# Patient Record
Sex: FEMALE | Race: BLACK OR AFRICAN AMERICAN | NOT HISPANIC OR LATINO | Employment: UNEMPLOYED | URBAN - METROPOLITAN AREA
[De-identification: names, ages, dates, MRNs, and addresses within clinical notes are randomized per-mention and may not be internally consistent; named-entity substitution may affect disease eponyms.]

---

## 2023-12-12 ENCOUNTER — OFFICE VISIT (OUTPATIENT)
Dept: PRIMARY CARE | Facility: CLINIC | Age: 48
End: 2023-12-12
Payer: COMMERCIAL

## 2023-12-12 VITALS
BODY MASS INDEX: 53.71 KG/M2 | TEMPERATURE: 97.9 F | HEART RATE: 95 BPM | WEIGHT: 273.6 LBS | HEIGHT: 60 IN | OXYGEN SATURATION: 95 % | SYSTOLIC BLOOD PRESSURE: 148 MMHG | DIASTOLIC BLOOD PRESSURE: 70 MMHG

## 2023-12-12 DIAGNOSIS — Z12.31 BREAST CANCER SCREENING BY MAMMOGRAM: ICD-10-CM

## 2023-12-12 DIAGNOSIS — J44.9 CHRONIC OBSTRUCTIVE PULMONARY DISEASE, UNSPECIFIED COPD TYPE (MULTI): ICD-10-CM

## 2023-12-12 DIAGNOSIS — E66.01 OBESITY, MORBID, BMI 50 OR HIGHER (MULTI): Primary | ICD-10-CM

## 2023-12-12 DIAGNOSIS — I10 PRIMARY HYPERTENSION: ICD-10-CM

## 2023-12-12 DIAGNOSIS — Z12.11 COLON CANCER SCREENING: ICD-10-CM

## 2023-12-12 PROBLEM — M25.561 KNEE PAIN, RIGHT: Status: ACTIVE | Noted: 2023-12-12

## 2023-12-12 PROBLEM — E55.9 VITAMIN D DEFICIENCY: Status: ACTIVE | Noted: 2023-12-12

## 2023-12-12 LAB
25(OH)D3 SERPL-MCNC: 7 NG/ML (ref 30–100)
ALBUMIN SERPL BCP-MCNC: 4.1 G/DL (ref 3.4–5)
ALP SERPL-CCNC: 63 U/L (ref 33–110)
ALT SERPL W P-5'-P-CCNC: 20 U/L (ref 7–45)
ANION GAP SERPL CALC-SCNC: 14 MMOL/L (ref 10–20)
AST SERPL W P-5'-P-CCNC: 22 U/L (ref 9–39)
BILIRUB SERPL-MCNC: 0.4 MG/DL (ref 0–1.2)
BUN SERPL-MCNC: 15 MG/DL (ref 6–23)
CALCIUM SERPL-MCNC: 8.7 MG/DL (ref 8.6–10.6)
CHLORIDE SERPL-SCNC: 102 MMOL/L (ref 98–107)
CHOLEST SERPL-MCNC: 242 MG/DL (ref 0–199)
CHOLESTEROL/HDL RATIO: 4.5
CO2 SERPL-SCNC: 26 MMOL/L (ref 21–32)
CREAT SERPL-MCNC: 0.79 MG/DL (ref 0.5–1.05)
ERYTHROCYTE [DISTWIDTH] IN BLOOD BY AUTOMATED COUNT: 13.8 % (ref 11.5–14.5)
EST. AVERAGE GLUCOSE BLD GHB EST-MCNC: 148 MG/DL
GFR SERPL CREATININE-BSD FRML MDRD: >90 ML/MIN/1.73M*2
GLUCOSE SERPL-MCNC: 113 MG/DL (ref 74–99)
HBA1C MFR BLD: 6.8 %
HCT VFR BLD AUTO: 43.7 % (ref 36–46)
HDLC SERPL-MCNC: 53.7 MG/DL
HGB BLD-MCNC: 14.7 G/DL (ref 12–16)
LDLC SERPL CALC-MCNC: 150 MG/DL
MCH RBC QN AUTO: 32.3 PG (ref 26–34)
MCHC RBC AUTO-ENTMCNC: 33.6 G/DL (ref 32–36)
MCV RBC AUTO: 96 FL (ref 80–100)
NON HDL CHOLESTEROL: 188 MG/DL (ref 0–149)
NRBC BLD-RTO: 0 /100 WBCS (ref 0–0)
PLATELET # BLD AUTO: 270 X10*3/UL (ref 150–450)
POTASSIUM SERPL-SCNC: 4.3 MMOL/L (ref 3.5–5.3)
PROT SERPL-MCNC: 7.4 G/DL (ref 6.4–8.2)
RBC # BLD AUTO: 4.55 X10*6/UL (ref 4–5.2)
SODIUM SERPL-SCNC: 138 MMOL/L (ref 136–145)
TRIGL SERPL-MCNC: 190 MG/DL (ref 0–149)
TSH SERPL-ACNC: 0.71 MIU/L (ref 0.44–3.98)
VLDL: 38 MG/DL (ref 0–40)
WBC # BLD AUTO: 7.3 X10*3/UL (ref 4.4–11.3)

## 2023-12-12 PROCEDURE — 83880 ASSAY OF NATRIURETIC PEPTIDE: CPT

## 2023-12-12 PROCEDURE — 3078F DIAST BP <80 MM HG: CPT | Performed by: STUDENT IN AN ORGANIZED HEALTH CARE EDUCATION/TRAINING PROGRAM

## 2023-12-12 PROCEDURE — 80061 LIPID PANEL: CPT

## 2023-12-12 PROCEDURE — 36415 COLL VENOUS BLD VENIPUNCTURE: CPT

## 2023-12-12 PROCEDURE — 80053 COMPREHEN METABOLIC PANEL: CPT

## 2023-12-12 PROCEDURE — 3077F SYST BP >= 140 MM HG: CPT | Performed by: STUDENT IN AN ORGANIZED HEALTH CARE EDUCATION/TRAINING PROGRAM

## 2023-12-12 PROCEDURE — 84443 ASSAY THYROID STIM HORMONE: CPT

## 2023-12-12 PROCEDURE — 99406 BEHAV CHNG SMOKING 3-10 MIN: CPT | Performed by: STUDENT IN AN ORGANIZED HEALTH CARE EDUCATION/TRAINING PROGRAM

## 2023-12-12 PROCEDURE — 82306 VITAMIN D 25 HYDROXY: CPT

## 2023-12-12 PROCEDURE — 99204 OFFICE O/P NEW MOD 45 MIN: CPT | Performed by: STUDENT IN AN ORGANIZED HEALTH CARE EDUCATION/TRAINING PROGRAM

## 2023-12-12 PROCEDURE — 85027 COMPLETE CBC AUTOMATED: CPT

## 2023-12-12 PROCEDURE — 83036 HEMOGLOBIN GLYCOSYLATED A1C: CPT

## 2023-12-12 RX ORDER — ACETAMINOPHEN 325 MG/1
TABLET ORAL
COMMUNITY
Start: 2015-05-27 | End: 2023-12-12 | Stop reason: ALTCHOICE

## 2023-12-12 RX ORDER — LISINOPRIL 5 MG/1
1 TABLET ORAL DAILY
COMMUNITY
Start: 2015-05-22 | End: 2023-12-12 | Stop reason: ALTCHOICE

## 2023-12-12 RX ORDER — PREDNISONE 20 MG/1
40 TABLET ORAL DAILY
Qty: 5 TABLET | Refills: 0 | Status: SHIPPED | OUTPATIENT
Start: 2023-12-12 | End: 2023-12-17

## 2023-12-12 RX ORDER — ALBUTEROL SULFATE 90 UG/1
2 AEROSOL, METERED RESPIRATORY (INHALATION) EVERY 4 HOURS PRN
Qty: 8.5 G | Refills: 3 | Status: SHIPPED | OUTPATIENT
Start: 2023-12-12 | End: 2024-12-11

## 2023-12-12 RX ORDER — FUROSEMIDE 20 MG/1
20 TABLET ORAL DAILY
Qty: 30 TABLET | Refills: 11 | Status: SHIPPED | OUTPATIENT
Start: 2023-12-12 | End: 2024-12-11

## 2023-12-12 RX ORDER — ASPIRIN 325 MG
TABLET, DELAYED RELEASE (ENTERIC COATED) ORAL
COMMUNITY
Start: 2015-05-22 | End: 2023-12-12 | Stop reason: ALTCHOICE

## 2023-12-12 RX ORDER — DOXYCYCLINE 100 MG/1
100 CAPSULE ORAL 2 TIMES DAILY
Qty: 10 CAPSULE | Refills: 0 | Status: SHIPPED | OUTPATIENT
Start: 2023-12-12 | End: 2023-12-17

## 2023-12-12 RX ORDER — HYDROCHLOROTHIAZIDE 25 MG/1
1 TABLET ORAL DAILY
COMMUNITY
Start: 2015-05-22 | End: 2023-12-12 | Stop reason: ALTCHOICE

## 2023-12-12 RX ORDER — LISINOPRIL 10 MG/1
10 TABLET ORAL DAILY
Qty: 30 TABLET | Refills: 5 | Status: SHIPPED | OUTPATIENT
Start: 2023-12-12 | End: 2024-06-09

## 2023-12-12 ASSESSMENT — PAIN SCALES - GENERAL: PAINLEVEL: 7

## 2023-12-12 NOTE — PROGRESS NOTES
Subjective   Patient ID: Nat Varela is a 48 y.o. female who presents for Annual Exam and New Patient Visit.  [facility]  Nat is a 49yo here to establish care.  Has not really followed with a physician over the last several years.  Her main complaint is shortness of breath and a cough that is been present for likely over a year.  She has been told she has bronchitis in the past however does not really appear to have had a thorough workup.  She did attempt to do PFTs at 1 point however could not complete the test as she could not do 6 breaths in a row.  She does smoke greater than 1.5 packs/day and has for as long as she can remember.  She has not been hospitalized or given steroids recently.  She is very deconditioned and is unable to walk more than 10 feet without wheezing and feeling exhausted.  Her feet swell occasionally and they are currently swollen.  She has a lot of trouble sleeping and occasionally stops breathing while she sleeping.  If she lays flat on her back she feels like there is a lot of pressure on her chest and across her shoulders.  If she lays on her right side she cannot breathe at all and starts coughing.  She has an albuterol inhaler at home however this is about it.  She does not really take any medications.    PMHx: HTN, obesity, tobacco abuse, osteoarthritis, VIVIEN suspected  SurgHx: none reported  FamHx: HTN, CAD, DM  SocialHx: smokes 1.5ppd for likely over 20 years, lives with [facility], has adult children, no drinking or illicit drugs    12-point ROS was reviewed and is negative, unless otherwise noted in HPI    Objective   Vitals:    12/12/23 1245   BP: 148/70   Pulse: 95   Temp: 36.6 °C (97.9 °F)   SpO2: 95%      GEN: alert, conversant   HEENT: PERRL, EOMI    NECK: supple, no LAD appreciated  CHEST: audible wheezing with diminished lung sounds diffusely, shallow breathing, wet cough with each breath  CV: 2+ lower extremity pitting edema to the mid shin  ABD: soft, nondistended,  "nontender  EXT: no obvious deformities  SKIN: warm, dry    Assessment and Plan:  1.  Chronic bronchitis  Chronic wet cough.  Significant tobacco abuse.  Previously attempted PFTs however could not complete the test due to shortness of breath.  Does not have a pulmonologist.  Strong recommendation to see a pulmonologist for PFTs and o2 titration, sleep study as well.  Will prescribe her triple therapy LABA LAMA ICS with rescue CICI.     2.  COPD exacerbation  Wheezing, short of breath, wet cough with increased sputum.  Prednisone and doxycycline for 5 days.  Formal PFTs strongly recommended. Triple therapy as above.     3.  Heart failure with unknown ejection fraction, exacerbation  Lower extremity edema, orthopnea.  Significant lung disease and tobacco use, obesity.  Strong clinical suspicion for heart failure, no formal echo on file.  Will start Lisinopril 10mg daily and give her furosemide 20mg daily.  Would strongly recommend she see a cardiologist and have a formal cardiac workup once her lung disease is optimized. Anticipate need for aspirin, statin.     4.  Pulmonary hypertension, mixed class II and III  VIVIEN, tobacco use, COPD, likely left heart disease. Optimization of medical regimen as above.     5.  VIVIEN, suspect  Would certainly benefit from weight loss and a sleep study. High STOPBANG score. Reportedly \"stops breathing\" during sleep per fipapoe.     6.  Tobacco use disorder  Counseled for greater than 5 minutes on cessation.  LDCT recommended and ordered.     7.  Primary Hypertension  Poorly controlled   Start lisinopril 10mg daily  today.  Comorbidities need to be managed.  Additionally, we will add furosemide daily.    8.  Obesity, class III  Sedentary lifestyle, consistent with \"blue bloater\" phenotype.  Counseled on weight loss and dietary modifications.    Health Maintenance:   Vaccines: Flu (UTD per patient), PNA (received Pneumovax, recommended Prevnar 20), Shingles (UTD)  Screening: LDCT, " mammogram (ordered), Colonoscopy (cologuard ordered), Paptest (last 2022 per patient)  Labs: cbc, cmp, lipid panel, A1c, bnp, TSH    RTC in 1mo, or sooner PRN    This note is not finalized until attending attestation is present.    Corby Norris    Trainee role: Resident    I saw and evaluated the patient. I personally obtained the key and critical portions of the history and physical exam or was physically present for key and critical portions performed by the trainee. I reviewed the trainee's documentation and discussed the patient with the trainee. I agree with the trainee's medical decision making as documented on the trainee's notes.    Torin Parsons, DO

## 2023-12-13 ENCOUNTER — TELEPHONE (OUTPATIENT)
Dept: PRIMARY CARE | Facility: CLINIC | Age: 48
End: 2023-12-13
Payer: COMMERCIAL

## 2023-12-13 DIAGNOSIS — E11.9 CONTROLLED TYPE 2 DIABETES MELLITUS WITHOUT COMPLICATION, WITHOUT LONG-TERM CURRENT USE OF INSULIN (MULTI): ICD-10-CM

## 2023-12-13 DIAGNOSIS — E55.9 VITAMIN D DEFICIENCY: ICD-10-CM

## 2023-12-13 DIAGNOSIS — E78.5 DYSLIPIDEMIA: Primary | ICD-10-CM

## 2023-12-13 LAB — BNP SERPL-MCNC: 3 PG/ML (ref 0–99)

## 2023-12-13 RX ORDER — ROSUVASTATIN CALCIUM 20 MG/1
20 TABLET, COATED ORAL DAILY
Qty: 30 TABLET | Refills: 11 | Status: SHIPPED | OUTPATIENT
Start: 2023-12-13 | End: 2024-12-12

## 2023-12-13 RX ORDER — METFORMIN HYDROCHLORIDE 500 MG/1
500 TABLET, EXTENDED RELEASE ORAL
Qty: 30 TABLET | Refills: 11 | Status: SHIPPED | OUTPATIENT
Start: 2023-12-13 | End: 2024-12-12

## 2023-12-13 RX ORDER — ERGOCALCIFEROL 1.25 MG/1
50000 CAPSULE ORAL
Qty: 8 CAPSULE | Refills: 0 | Status: SHIPPED | OUTPATIENT
Start: 2023-12-13 | End: 2024-02-07

## 2023-12-13 NOTE — TELEPHONE ENCOUNTER
Result Communication    Resulted Orders   CBC   Result Value Ref Range    WBC 7.3 4.4 - 11.3 x10*3/uL    nRBC 0.0 0.0 - 0.0 /100 WBCs    RBC 4.55 4.00 - 5.20 x10*6/uL    Hemoglobin 14.7 12.0 - 16.0 g/dL    Hematocrit 43.7 36.0 - 46.0 %    MCV 96 80 - 100 fL    MCH 32.3 26.0 - 34.0 pg    MCHC 33.6 32.0 - 36.0 g/dL    RDW 13.8 11.5 - 14.5 %    Platelets 270 150 - 450 x10*3/uL   Comprehensive Metabolic Panel   Result Value Ref Range    Glucose 113 (H) 74 - 99 mg/dL    Sodium 138 136 - 145 mmol/L    Potassium 4.3 3.5 - 5.3 mmol/L    Chloride 102 98 - 107 mmol/L    Bicarbonate 26 21 - 32 mmol/L    Anion Gap 14 10 - 20 mmol/L    Urea Nitrogen 15 6 - 23 mg/dL    Creatinine 0.79 0.50 - 1.05 mg/dL    eGFR >90 >60 mL/min/1.73m*2      Comment:      Calculations of estimated GFR are performed using the 2021 CKD-EPI Study Refit equation without the race variable for the IDMS-Traceable creatinine methods.  https://jasn.asnjournals.org/content/early/2021/09/22/ASN.6357248057    Calcium 8.7 8.6 - 10.6 mg/dL    Albumin 4.1 3.4 - 5.0 g/dL    Alkaline Phosphatase 63 33 - 110 U/L    Total Protein 7.4 6.4 - 8.2 g/dL    AST 22 9 - 39 U/L    Bilirubin, Total 0.4 0.0 - 1.2 mg/dL    ALT 20 7 - 45 U/L      Comment:      Patients treated with Sulfasalazine may generate falsely decreased results for ALT.   Hemoglobin A1C   Result Value Ref Range    Hemoglobin A1C 6.8 (H) see below %    Estimated Average Glucose 148 Not Established mg/dL    Narrative    Diagnosis of Diabetes-Adults  Non-Diabetic: < or = 5.6%  Increased risk for developing diabetes: 5.7-6.4%  Diagnostic of diabetes: > or = 6.5%    Monitoring of Diabetes  Age (y)....................... Therapeutic Goal (%)  Adults: >18.........................<7.0  Pediatrics: 13-18...................<7.5  Pediatrics: 7-12....................<8.0  Pediatrics: 0-6..................... 7.5-8.5    American Diabetes Association. Diabetes Care 33(S1), Jan 2010       Lipid Panel   Result Value Ref  Range    Cholesterol 242 (H) 0 - 199 mg/dL      Comment:            Age      Desirable   Borderline High   High     0-19 Y     0 - 169       170 - 199     >/= 200    20-24 Y     0 - 189       190 - 224     >/= 225         >24 Y     0 - 199       200 - 239     >/= 240   **All ranges are based on fasting samples. Specific   therapeutic targets will vary based on patient-specific   cardiac risk.    Pediatric guidelines reference:Pediatrics 2011, 128(S5).Adult guidelines reference: NCEP ATPIII Guidelines,SHELBI 2001, 258:2486-97    Venipuncture immediately after or during the administration of Metamizole may lead to falsely low results. Testing should be performed immediately prior to Metamizole dosing.    HDL-Cholesterol 53.7 mg/dL      Comment:        Age       Very Low   Low     Normal    High    0-19 Y    < 35      < 40     40-45     ----  20-24 Y    ----     < 40      >45      ----        >24 Y      ----     < 40     40-60      >60      Cholesterol/HDL Ratio 4.5       Comment:        Ref Values  Desirable  < 3.4  High Risk  > 5.0    LDL Calculated 150 (H) <=99 mg/dL      Comment:                                  Near   Borderline      AGE      Desirable  Optimal    High     High     Very High     0-19 Y     0 - 109     ---    110-129   >/= 130     ----    20-24 Y     0 - 119     ---    120-159   >/= 160     ----      >24 Y     0 -  99   100-129  130-159   160-189     >/=190      VLDL 38 0 - 40 mg/dL    Triglycerides 190 (H) 0 - 149 mg/dL      Comment:         Age         Desirable   Borderline High   High     Very High   0 D-90 D    19 - 174         ----         ----        ----  91 D- 9 Y     0 -  74        75 -  99     >/= 100      ----    10-19 Y     0 -  89        90 - 129     >/= 130      ----    20-24 Y     0 - 114       115 - 149     >/= 150      ----         >24 Y     0 - 149       150 - 199    200- 499    >/= 500    Venipuncture immediately after or during the administration of Metamizole may lead to falsely  low results. Testing should be performed immediately prior to Metamizole dosing.    Non HDL Cholesterol 188 (H) 0 - 149 mg/dL      Comment:            Age       Desirable   Borderline High   High     Very High     0-19 Y     0 - 119       120 - 144     >/= 145    >/= 160    20-24 Y     0 - 149       150 - 189     >/= 190      ----         >24 Y    30 mg/dL above LDL Cholesterol goal     TSH with reflex to Free T4 if abnormal   Result Value Ref Range    Thyroid Stimulating Hormone 0.71 0.44 - 3.98 mIU/L    Narrative    TSH testing is performed using different testing methodology at [facility] than at other [facility]. Direct result comparisons should only be made within the same method.     Vitamin D 25-Hydroxy,Total (for eval of Vitamin D levels)   Result Value Ref Range    Vitamin D, 25-Hydroxy, Total 7 (L) 30 - 100 ng/mL    Narrative    Deficiency:         < 20   ng/ml  Insufficiency:      20-29  ng/ml  Sufficiency:         ng/ml  This assay accurately quantifies the sum of Vitamin D3, 25-Hydroxy and Vitamin D2,25-Hydroxy.       9:16 AM      Results were successfully communicated with the patient and they acknowledged their understanding. We will start Metformin, Statin therapy and Vitamin D supplementation.

## 2023-12-22 DIAGNOSIS — J44.9 CHRONIC OBSTRUCTIVE PULMONARY DISEASE, UNSPECIFIED COPD TYPE (MULTI): Primary | ICD-10-CM

## 2023-12-22 RX ORDER — BUDESONIDE AND FORMOTEROL FUMARATE DIHYDRATE 160; 4.5 UG/1; UG/1
2 AEROSOL RESPIRATORY (INHALATION)
Qty: 1 EACH | Refills: 11 | Status: SHIPPED | OUTPATIENT
Start: 2023-12-22 | End: 2024-12-21

## 2023-12-28 ENCOUNTER — ANCILLARY PROCEDURE (OUTPATIENT)
Dept: RADIOLOGY | Facility: CLINIC | Age: 48
End: 2023-12-28
Payer: COMMERCIAL

## 2023-12-28 DIAGNOSIS — Z12.31 BREAST CANCER SCREENING BY MAMMOGRAM: ICD-10-CM

## 2023-12-28 DIAGNOSIS — J44.9 CHRONIC OBSTRUCTIVE PULMONARY DISEASE, UNSPECIFIED COPD TYPE (MULTI): ICD-10-CM

## 2023-12-28 PROCEDURE — 77067 SCR MAMMO BI INCL CAD: CPT | Performed by: RADIOLOGY

## 2023-12-28 PROCEDURE — 77067 SCR MAMMO BI INCL CAD: CPT

## 2023-12-28 PROCEDURE — 71271 CT THORAX LUNG CANCER SCR C-: CPT | Performed by: RADIOLOGY

## 2023-12-28 PROCEDURE — 77063 BREAST TOMOSYNTHESIS BI: CPT | Performed by: RADIOLOGY

## 2023-12-28 PROCEDURE — 71271 CT THORAX LUNG CANCER SCR C-: CPT

## 2024-01-04 ENCOUNTER — TELEPHONE (OUTPATIENT)
Dept: PRIMARY CARE | Facility: CLINIC | Age: 49
End: 2024-01-04
Payer: COMMERCIAL

## 2024-01-04 NOTE — TELEPHONE ENCOUNTER
Says she got a phone call, something is wrong with her breast and wants to speak to you about it [phone]

## 2024-01-08 ENCOUNTER — HOSPITAL ENCOUNTER (OUTPATIENT)
Dept: RESPIRATORY THERAPY | Facility: HOSPITAL | Age: 49
Discharge: HOME | End: 2024-01-08
Payer: COMMERCIAL

## 2024-01-08 DIAGNOSIS — J44.9 CHRONIC OBSTRUCTIVE PULMONARY DISEASE, UNSPECIFIED COPD TYPE (MULTI): ICD-10-CM

## 2024-01-08 PROCEDURE — 94726 PLETHYSMOGRAPHY LUNG VOLUMES: CPT | Performed by: INTERNAL MEDICINE

## 2024-01-08 PROCEDURE — 94060 EVALUATION OF WHEEZING: CPT

## 2024-01-08 PROCEDURE — 94726 PLETHYSMOGRAPHY LUNG VOLUMES: CPT

## 2024-01-08 PROCEDURE — 94729 DIFFUSING CAPACITY: CPT

## 2024-01-08 PROCEDURE — 94060 EVALUATION OF WHEEZING: CPT | Performed by: INTERNAL MEDICINE

## 2024-01-08 PROCEDURE — 94729 DIFFUSING CAPACITY: CPT | Performed by: INTERNAL MEDICINE

## 2024-01-08 PROCEDURE — 94640 AIRWAY INHALATION TREATMENT: CPT

## 2024-01-16 ENCOUNTER — OFFICE VISIT (OUTPATIENT)
Dept: PRIMARY CARE | Facility: CLINIC | Age: 49
End: 2024-01-16
Payer: COMMERCIAL

## 2024-01-16 VITALS
BODY MASS INDEX: 53.71 KG/M2 | TEMPERATURE: 98.2 F | SYSTOLIC BLOOD PRESSURE: 136 MMHG | DIASTOLIC BLOOD PRESSURE: 64 MMHG | HEART RATE: 77 BPM | OXYGEN SATURATION: 100 % | WEIGHT: 275 LBS

## 2024-01-16 DIAGNOSIS — J44.9 CHRONIC OBSTRUCTIVE PULMONARY DISEASE, UNSPECIFIED COPD TYPE (MULTI): ICD-10-CM

## 2024-01-16 DIAGNOSIS — R29.818 SUSPECTED SLEEP APNEA: Primary | ICD-10-CM

## 2024-01-16 DIAGNOSIS — L98.9 SKIN LESION: ICD-10-CM

## 2024-01-16 DIAGNOSIS — E11.9 CONTROLLED TYPE 2 DIABETES MELLITUS WITHOUT COMPLICATION, WITHOUT LONG-TERM CURRENT USE OF INSULIN (MULTI): ICD-10-CM

## 2024-01-16 DIAGNOSIS — E78.5 DYSLIPIDEMIA: ICD-10-CM

## 2024-01-16 DIAGNOSIS — E66.01 OBESITY, MORBID, BMI 50 OR HIGHER (MULTI): ICD-10-CM

## 2024-01-16 DIAGNOSIS — G47.33 OSA (OBSTRUCTIVE SLEEP APNEA): ICD-10-CM

## 2024-01-16 PROCEDURE — 4010F ACE/ARB THERAPY RXD/TAKEN: CPT | Performed by: STUDENT IN AN ORGANIZED HEALTH CARE EDUCATION/TRAINING PROGRAM

## 2024-01-16 PROCEDURE — 3075F SYST BP GE 130 - 139MM HG: CPT | Performed by: STUDENT IN AN ORGANIZED HEALTH CARE EDUCATION/TRAINING PROGRAM

## 2024-01-16 PROCEDURE — 99214 OFFICE O/P EST MOD 30 MIN: CPT | Performed by: STUDENT IN AN ORGANIZED HEALTH CARE EDUCATION/TRAINING PROGRAM

## 2024-01-16 PROCEDURE — 3078F DIAST BP <80 MM HG: CPT | Performed by: STUDENT IN AN ORGANIZED HEALTH CARE EDUCATION/TRAINING PROGRAM

## 2024-01-16 ASSESSMENT — PAIN SCALES - GENERAL: PAINLEVEL: 7

## 2024-01-16 NOTE — PROGRESS NOTES
Subjective   Patient ID: Nat Varela is a 48 y.o. female who presents for Follow-up.  [facility]  Nat is here today for follow up visit.    She has been using the Symbicort inhaler daily as prescribed and has been feeling somewhat improved. She continues to have wheezing and a cough. Does still have dyspnea on exertion. She did her PFTs, has not yet followed up with Pulmonology. Boyfriend also reports continued snoring, apneic episodes while she sleeps at night. She has been taking all of her other medications as prescribed. She has been cutting down on cigarettes, down to 3 daily. She denies eating a lot of sugar, but does drink soda frequently, juice and uses spoonfuls of sugar in her coffee.     ROS  12-point ROS was reviewed and is negative, unless otherwise noted in HPI    Objective   Vitals:    01/16/24 1101   BP: 136/64   Pulse: 77   Temp: 36.8 °C (98.2 °F)   SpO2: 100%      GEN: alert, conversant   HEENT: PERRL, EOMI    NECK: supple, no LAD appreciated  CHEST: audible wheezing with diminished lung sounds diffusely  CV: RRR, no pitting edema  ABD: soft, nondistended, nontender  EXT: no obvious deformities  SKIN: warm, dry    Assessment and Plan:  #COPD, chronic bronchitis  #Tobacco use disorder  #pulmonary nodules, up to ~3mm  Chronic wet cough. PFTs obtained recently. Significant tobacco abuse. down to 3 cigarettes daily, working to stop completely  - Counseled for greater than 5 minutes on cessation.   - Continue Symbicort daily, albuterol PRN (Trellegy was denied by insurance)  - Referral to Pulmonology placed at last visit  - Repeat LDCT in 12/2024    #suspected CHF  #DLD  - Continue Lisinopril 10mg daily, furosemide 20mg daily.    - Continue Rosuvastatin 20mg daily  - Will have her follow up with a cardiologist and have a formal cardiac workup once her lung disease is optimized.     #Pulmonary hypertension, mixed class II and III  VIVIEN, tobacco use, COPD, likely left heart disease. Optimization of  "medical regimen as above.     #VIVIEN, suspect  Would certainly benefit from weight loss and a sleep study. High STOPBANG score. Reportedly \"stops breathing\" during sleep per letty.   - Referral to Sleep Medicine placed today    #Primary Hypertension  Improved control  Continue lisinopril 10mg daily     #Diabetes Mellitus type 2  Most Recent HgbA1c: 6.8%  - Continue Metformin 500mg daily  Lipid panel, microalbumin checked less than a year ago.  Increase dietary efforts and physical activity.  Routine diabetic retinopathy screening, foot exam/monofilament testing screening: up-to-date.    #Obesity, class III  Sedentary lifestyle, consistent with \"blue bloater\" phenotype.  Counseled on weight loss and dietary modifications.    #skin changes  - Referral to dermatology placed    Health Maintenance:   Vaccines: Flu (UTD per patient), PNA (received Pneumovax, recommended Prevnar 20), Shingles (UTD)  Screening: LDCT (repeat 12/2024), mammogram (following up with diagnostic mammogram), Colonoscopy (cologuard ordered), Paptest (last 2022 per patient)  Labs: Reviewed recent labwork    RTC in 3 mo, or sooner PRGABRIEL Parsons, DO      "

## 2024-01-24 LAB
MGC ASCENT PFT - FEV1 - POST: 1.66
MGC ASCENT PFT - FEV1 - PRE: 1.69
MGC ASCENT PFT - FEV1 - PREDICTED: 2.25
MGC ASCENT PFT - FVC - POST: 2.03
MGC ASCENT PFT - FVC - PRE: 2.07
MGC ASCENT PFT - FVC - PREDICTED: 2.71

## 2024-02-01 ENCOUNTER — HOSPITAL ENCOUNTER (OUTPATIENT)
Dept: RADIOLOGY | Facility: CLINIC | Age: 49
End: 2024-02-01
Payer: COMMERCIAL

## 2024-02-19 DIAGNOSIS — E55.9 VITAMIN D DEFICIENCY: ICD-10-CM

## 2024-02-20 DIAGNOSIS — E55.9 VITAMIN D DEFICIENCY: Primary | ICD-10-CM

## 2024-02-20 RX ORDER — ACETAMINOPHEN 500 MG
5000 TABLET ORAL DAILY
Qty: 90 TABLET | Refills: 1 | Status: SHIPPED | OUTPATIENT
Start: 2024-02-20 | End: 2024-08-18

## 2024-02-20 RX ORDER — ERGOCALCIFEROL 1.25 MG/1
50000 CAPSULE ORAL
Qty: 8 CAPSULE | Refills: 0 | OUTPATIENT
Start: 2024-02-20

## 2024-02-23 ENCOUNTER — HOSPITAL ENCOUNTER (OUTPATIENT)
Dept: RADIOLOGY | Facility: HOSPITAL | Age: 49
Discharge: HOME | End: 2024-02-23
Payer: COMMERCIAL

## 2024-02-23 DIAGNOSIS — R92.8 OTHER ABNORMAL AND INCONCLUSIVE FINDINGS ON DIAGNOSTIC IMAGING OF BREAST: ICD-10-CM

## 2024-02-23 PROCEDURE — 77061 BREAST TOMOSYNTHESIS UNI: CPT | Mod: LT

## 2024-02-23 PROCEDURE — G0279 TOMOSYNTHESIS, MAMMO: HCPCS | Mod: LEFT SIDE | Performed by: RADIOLOGY

## 2024-02-23 PROCEDURE — 77065 DX MAMMO INCL CAD UNI: CPT | Mod: LEFT SIDE | Performed by: RADIOLOGY

## 2024-02-27 ENCOUNTER — OFFICE VISIT (OUTPATIENT)
Dept: PULMONOLOGY | Facility: CLINIC | Age: 49
End: 2024-02-27
Payer: COMMERCIAL

## 2024-02-27 VITALS
HEART RATE: 80 BPM | SYSTOLIC BLOOD PRESSURE: 124 MMHG | BODY MASS INDEX: 53.32 KG/M2 | WEIGHT: 273 LBS | OXYGEN SATURATION: 95 % | TEMPERATURE: 98.2 F | DIASTOLIC BLOOD PRESSURE: 82 MMHG

## 2024-02-27 DIAGNOSIS — J45.909 ASTHMA, UNSPECIFIED ASTHMA SEVERITY, UNSPECIFIED WHETHER COMPLICATED, UNSPECIFIED WHETHER PERSISTENT (HHS-HCC): ICD-10-CM

## 2024-02-27 DIAGNOSIS — F17.210 CIGARETTE NICOTINE DEPENDENCE WITHOUT COMPLICATION: ICD-10-CM

## 2024-02-27 DIAGNOSIS — J42 CHRONIC BRONCHITIS, UNSPECIFIED CHRONIC BRONCHITIS TYPE (MULTI): ICD-10-CM

## 2024-02-27 DIAGNOSIS — R06.09 DOE (DYSPNEA ON EXERTION): Primary | ICD-10-CM

## 2024-02-27 DIAGNOSIS — J30.9 ALLERGIC RHINITIS, UNSPECIFIED SEASONALITY, UNSPECIFIED TRIGGER: ICD-10-CM

## 2024-02-27 PROCEDURE — 99215 OFFICE O/P EST HI 40 MIN: CPT | Performed by: NURSE PRACTITIONER

## 2024-02-27 PROCEDURE — 3079F DIAST BP 80-89 MM HG: CPT | Performed by: NURSE PRACTITIONER

## 2024-02-27 PROCEDURE — 3074F SYST BP LT 130 MM HG: CPT | Performed by: NURSE PRACTITIONER

## 2024-02-27 RX ORDER — ASPIRIN/CALCIUM CARB/MAGNESIUM 325 MG
TABLET ORAL
Qty: 200 LOZENGE | Refills: 2 | Status: SHIPPED | OUTPATIENT
Start: 2024-02-27

## 2024-02-27 RX ORDER — CETIRIZINE HYDROCHLORIDE 10 MG/1
10 TABLET ORAL DAILY
Qty: 90 TABLET | Refills: 3 | Status: SHIPPED | OUTPATIENT
Start: 2024-02-27 | End: 2025-02-26

## 2024-02-27 RX ORDER — ALBUTEROL SULFATE 0.83 MG/ML
2.5 SOLUTION RESPIRATORY (INHALATION) 4 TIMES DAILY PRN
Qty: 180 ML | Refills: 11 | Status: SHIPPED | OUTPATIENT
Start: 2024-02-27 | End: 2025-02-26

## 2024-02-27 RX ORDER — IBUPROFEN 200 MG
TABLET ORAL
Qty: 42 PATCH | Refills: 0 | Status: SHIPPED | OUTPATIENT
Start: 2024-02-27 | End: 2024-04-16 | Stop reason: WASHOUT

## 2024-02-27 ASSESSMENT — ENCOUNTER SYMPTOMS
EYE PAIN: 0
DIARRHEA: 0
AGITATION: 0
RHINORRHEA: 0
NAUSEA: 0
MYALGIAS: 0
BACK PAIN: 0
JOINT SWELLING: 0
NERVOUS/ANXIOUS: 0
SINUS PRESSURE: 0
NUMBNESS: 0
PALPITATIONS: 0
FEVER: 0
ABDOMINAL PAIN: 0
DIZZINESS: 0
WEAKNESS: 0
HEADACHES: 0
ARTHRALGIAS: 0
FATIGUE: 1
VOMITING: 0
VOICE CHANGE: 0

## 2024-02-27 ASSESSMENT — PAIN SCALES - GENERAL: PAINLEVEL: 4

## 2024-02-27 NOTE — LETTER
To Whom It May Concern:     Ms. Varela was seen in clinic on 2/27/24.  If you have any questions please contact my office at [phone].      Sincerely,     Leeanna BARAJAS

## 2024-02-27 NOTE — PATIENT INSTRUCTIONS
Chronic bronchitis/dyspnea on exertion : PFTs without obstruction/ BD resp with mild restriction.   - continue Symbicort 2 puffs twice daily - rinse mouth out afterwards   - continue albuterol HFA inhaler 2 puffs or albuterol nebulizer treatment every 4-6 hours as needed for shortness of breath    - will order nebulizer machine   - will get baseline echo -- [phone]  - Continue to increase activity as tolerated     2. Lung cancer screening   - >5 minutes smoking cessation counseling   - start nicotine patches 21mg daily   - start nicotine lozenges PRN     3. Allergic rhinitis:   - start cetirizine (zyrtec) 10mg daily at bedtime     4. Snoring: referred by PCP to sleep medicine     Thank you for visiting the Pulmonary clinic today!   Return to clinic 3 months after Echo or sooner if needed   Leeanna Lux CNP  My office -  [phone]- Brisa is my  and Lisa is my nurse.   Radiology scheduling [phone]   Appointment scheduling [phone]   Pulmonary function testing - [phone]

## 2024-02-27 NOTE — PROGRESS NOTES
Patient: Nat Varela    [id number]  : 1975 -- AGE 48 y.o.    Provider: WANG Mckeon-CNP     Location [facility]   Service Date: 2024              [facility]  New Visit Note      HISTORY OF PRESENT ILLNESS     The patient's referring provider is: Torin Parsons DO    HISTORY OF PRESENT ILLNESS   Nat Varela is a 48 y.o. female (current smoker) who presents to a [facility] for an evaluation with concerns of No chief complaint on file.. I have independently interviewed and examined the patient in the office and reviewed available records.    Current History    She was started on the symbicort about a month ago. She has CRISTINA with walking on level ground. She states she has always had issues with chronic bronchitis, but over the last 1-2 years it has been significantly worse. She states it has been going on for a while. She has been using her symbicort twice a day. She has been using her albuterol 3-4 x a day. She has been trying to walk with her fiance - states she has to stop and take breaks with exercise. She has a productive cough with yellowish mucous - thick. She states this is at her baseline. She feels the symbicort helps loosen to mucous so she is able to bring it up. She does not have a nebulizer. Her fiance notices wheezing with exertion. She will at times have episodes of SOB at rest. She has issues with her sinuses - runny nose, post nasal drip, water eyes, sneezing, and itchy throat. She states her symptoms are year round. She previously taking bendryl - as a kid she was on allergy shots. She has had episodes of GERD with eating - states at times she will vomit the food up. She states this is happening 1-2 x per week. She states she has noticed certain foods/ beverages trigger her. She tries to sit up after she eats, but if she doesn't that makes it worse. She says her  GERD has been worse over the last 6 months. She has chest tightness with exertion - improves with albuterol inhaler, but it takes a minute. She denies any sharp chest pains. No LE swelling -- states they were swollen when she saw her PCP in Dec and she was started on BP pills. She is still smoking, but working on cutting down.     ACT today 9/ CAT today 32    Previous pulmonary history: She was previously told she has asthma. She was told in 2005 she was told she had COPD. She was on PRN albuterol back then.     Inhalers/nebulized medications: symbicort and albuterol     Hospitalization History: She has not been hospitalized over the last year for breathing related problem.    Sleep history: + snoring and witnessed apneas - referred to sleep by PCP        ALLERGIES AND MEDICATIONS     ALLERGIES  Allergies   Allergen Reactions    Aspirin Anaphylaxis, Angioedema and Unknown    Penicillins Rash       MEDICATIONS  Current Outpatient Medications   Medication Sig Dispense Refill    albuterol (ProAir HFA) 90 mcg/actuation inhaler Inhale 2 puffs every 4 hours if needed for wheezing or shortness of breath. 8.5 g 3    budesonide-formoteroL (Symbicort) 160-4.5 mcg/actuation inhaler Inhale 2 puffs 2 times a day. Rinse mouth with water after use to reduce aftertaste and incidence of candidiasis. Do not swallow. 1 each 11    cholecalciferol (Vitamin D3) 5,000 Units tablet Take 1 tablet (5,000 Units) by mouth once daily. 90 tablet 1    furosemide (Lasix) 20 mg tablet Take 1 tablet (20 mg) by mouth once daily. 30 tablet 11    lisinopril 10 mg tablet Take 1 tablet (10 mg) by mouth once daily. 30 tablet 5    metFORMIN XR (Glucophage-XR) 500 mg 24 hr tablet Take 1 tablet (500 mg) by mouth once daily with a meal. Do not crush, chew, or split. 30 tablet 11    rosuvastatin (Crestor) 20 mg tablet Take 1 tablet (20 mg) by mouth once daily. 30 tablet 11     No current facility-administered medications for this visit.         PAST HISTORY      PAST MEDICAL HISTORY  - HTN   - HLD   - DMII   - GERD   - asthma     PAST SURGICAL HISTORY  No past surgical history on file.    IMMUNIZATION HISTORY  Immunization History   Administered Date(s) Administered    Influenza, Unspecified 2010    Pneumococcal polysaccharide vaccine, 23-valent, age 2 years and older (PNEUMOVAX 23) 2015       SOCIAL HISTORY  Smokin - current 2 ppd - now down to a pack a day   Alcohol: 2 drinks per week - previously 6 beers a night   Illicit drugs:  none     OCCUPATIONAL/ENVIRONMENTAL HISTORY  Temporarily unemployed - housekeeping, kendrick.     FAMILY HISTORY  Dad - lung cancer     RESULTS/DATA     Pulmonary Function Test Results     24 - FEV1/FVC 0.81/ FEV1 1.66 (73% no BD resp)/ FVC 2.03 (74%)/ TLC 3.61 (81%)/ DLCO 92%     Chest Radiograph     CHEST 2 VIEW 2020  Impression  Suspect bronchitis.       Chest CT Scan     CT lung screening low dose 2023  Impression  1.  Few small bilateral noncalcified pulmonary nodules measuring up  to 3 mm, likely benign. Continued screening with low-dose noncontrast  chest CT in 12 months (from current date) is recommended.  2. Mild coronary artery calcifications.    Echocardiogram     None on record       Other testing/ Labs      REVIEW OF SYSTEMS     REVIEW OF SYSTEMS  Review of Systems   Constitutional:  Positive for fatigue. Negative for fever.   HENT:  Negative for congestion, postnasal drip, rhinorrhea, sinus pressure and voice change.    Eyes:  Positive for visual disturbance. Negative for pain.   Cardiovascular:  Positive for leg swelling. Negative for chest pain and palpitations.   Gastrointestinal:  Negative for abdominal pain, diarrhea, nausea and vomiting.   Endocrine: Negative for cold intolerance and heat intolerance.   Musculoskeletal:  Negative for arthralgias, back pain, joint swelling and myalgias.   Skin:  Negative for rash.   Neurological:  Negative for dizziness, weakness, numbness and headaches.    Psychiatric/Behavioral:  Negative for agitation. The patient is not nervous/anxious.          PHYSICAL EXAM     VITAL SIGNS: /82   Pulse 80   Temp 36.8 °C (98.2 °F)   Wt 124 kg (273 lb)   SpO2 95%   BMI 53.32 kg/m²      CURRENT WEIGHT: [unfilled]  BMI: [unfilled]  PREVIOUS WEIGHTS:  Wt Readings from Last 3 Encounters:   02/27/24 124 kg (273 lb)   01/16/24 125 kg (275 lb)   12/12/23 124 kg (273 lb 9.6 oz)       Physical Exam  Vitals reviewed.   Constitutional:       General: She is not in acute distress.     Appearance: Normal appearance. She is not ill-appearing or toxic-appearing.   HENT:      Head: Normocephalic.      Nose: No rhinorrhea.   Cardiovascular:      Rate and Rhythm: Normal rate and regular rhythm.      Heart sounds: Normal heart sounds.   Pulmonary:      Effort: Pulmonary effort is normal. No respiratory distress.      Breath sounds: No stridor. Wheezing present. No rhonchi or rales.   Abdominal:      General: Abdomen is flat.   Musculoskeletal:         General: Normal range of motion.      Right lower leg: No edema.      Left lower leg: No edema.   Skin:     General: Skin is warm and dry.      Nails: There is no clubbing.   Neurological:      General: No focal deficit present.      Mental Status: She is alert and oriented to person, place, and time.   Psychiatric:         Mood and Affect: Mood normal.         Behavior: Behavior normal.         Judgment: Judgment normal.         ASSESSMENT/PLAN     Chronic bronchitis/dyspnea on exertion/ Asthma  : PFTs without obstruction/ BD resp with mild restriction.   - continue Symbicort 2 puffs twice daily - rinse mouth out afterwards   - continue albuterol HFA inhaler 2 puffs or albuterol nebulizer treatment every 4-6 hours as needed for shortness of breath    - will order nebulizer machine   - will get baseline echo   - Continue to increase activity as tolerated     2. Lung cancer screening   - >5 minutes smoking cessation counseling -- given [facility] book  today   - start nicotine patches 21mg daily   - start nicotine lozenges PRN   - lung cancer screening in 12/2024     3. Allergic rhinitis:   - start cetirizine (zyrtec) 10mg daily at bedtime     4. Snoring: referred by PCP to sleep medicine     5. GERD/ vomiting episodes:   - discuss with your PCP - may need to start GERD medication vs see a GI doctor     Thank you for visiting the Pulmonary clinic today!   Return to clinic 3 months after Echo or sooner if needed   Leeanna Lux CNP  My office -  [phone]- Brisa is my  and Lisa is my nurse.   Radiology scheduling [phone]   Appointment scheduling [phone]   Pulmonary function testing - [phone]

## 2024-03-18 ENCOUNTER — OFFICE VISIT (OUTPATIENT)
Dept: SLEEP MEDICINE | Facility: CLINIC | Age: 49
End: 2024-03-18
Payer: COMMERCIAL

## 2024-03-18 VITALS
BODY MASS INDEX: 53.32 KG/M2 | HEIGHT: 60 IN | DIASTOLIC BLOOD PRESSURE: 86 MMHG | HEART RATE: 94 BPM | WEIGHT: 271.6 LBS | RESPIRATION RATE: 20 BRPM | SYSTOLIC BLOOD PRESSURE: 130 MMHG | OXYGEN SATURATION: 93 %

## 2024-03-18 DIAGNOSIS — G47.30 SLEEP DISORDER BREATHING: Primary | ICD-10-CM

## 2024-03-18 DIAGNOSIS — R29.818 SUSPECTED SLEEP APNEA: ICD-10-CM

## 2024-03-18 DIAGNOSIS — E66.9 OBESITY, UNSPECIFIED CLASSIFICATION, UNSPECIFIED OBESITY TYPE, UNSPECIFIED WHETHER SERIOUS COMORBIDITY PRESENT: ICD-10-CM

## 2024-03-18 PROCEDURE — 3075F SYST BP GE 130 - 139MM HG: CPT | Performed by: GENERAL PRACTICE

## 2024-03-18 PROCEDURE — 3079F DIAST BP 80-89 MM HG: CPT | Performed by: GENERAL PRACTICE

## 2024-03-18 PROCEDURE — 99204 OFFICE O/P NEW MOD 45 MIN: CPT | Performed by: GENERAL PRACTICE

## 2024-03-18 ASSESSMENT — SLEEP AND FATIGUE QUESTIONNAIRES
WORRIED_DISTRESSED_DUE_TO_SLEEP: VERY MUCH NOTICEABLE
HOW LIKELY ARE YOU TO NOD OFF OR FALL ASLEEP WHILE LYING DOWN TO REST IN THE AFTERNOON WHEN CIRCUMSTANCES PERMIT: MODERATE CHANCE OF DOZING
HOW LIKELY ARE YOU TO NOD OFF OR FALL ASLEEP WHILE SITTING QUIETLY AFTER LUNCH WITHOUT ALCOHOL: WOULD NEVER DOZE
HOW LIKELY ARE YOU TO NOD OFF OR FALL ASLEEP WHILE WATCHING TV: MODERATE CHANCE OF DOZING
SLEEP_PROBLEM_NOTICEABLE_TO_OTHERS: VERY MUCH NOTICEABLE
SITING INACTIVE IN A PUBLIC PLACE LIKE A CLASS ROOM OR A MOVIE THEATER: SLIGHT CHANCE OF DOZING
HOW LIKELY ARE YOU TO NOD OFF OR FALL ASLEEP WHILE SITTING AND TALKING TO SOMEONE: WOULD NEVER DOZE
SATISFACTION_WITH_CURRENT_SLEEP_PATTERN: DISSATISFIED
WAKING_TOO_EARLY: MODERATE
HOW LIKELY ARE YOU TO NOD OFF OR FALL ASLEEP WHILE SITTING AND READING: MODERATE CHANCE OF DOZING
HOW LIKELY ARE YOU TO NOD OFF OR FALL ASLEEP WHEN YOU ARE A PASSENGER IN A CAR FOR AN HOUR WITHOUT A BREAK: MODERATE CHANCE OF DOZING
DIFFICULTY_FALLING_ASLEEP: VERY SEVERE
HOW LIKELY ARE YOU TO NOD OFF OR FALL ASLEEP IN A CAR, WHILE STOPPED FOR A FEW MINUTES IN TRAFFIC: HIGH CHANCE OF DOZING
SLEEP_PROBLEM_INTERFERES_DAILY_ACTIVITIES: MUCH
ESS-CHAD TOTAL SCORE: 12
DIFFICULTY_STAYING_ASLEEP: SEVERE

## 2024-03-18 ASSESSMENT — ENCOUNTER SYMPTOMS
DEPRESSION: 1
OCCASIONAL FEELINGS OF UNSTEADINESS: 0
LOSS OF SENSATION IN FEET: 0

## 2024-03-18 ASSESSMENT — PATIENT HEALTH QUESTIONNAIRE - PHQ9
SUM OF ALL RESPONSES TO PHQ9 QUESTIONS 1 AND 2: 1
2. FEELING DOWN, DEPRESSED OR HOPELESS: SEVERAL DAYS
1. LITTLE INTEREST OR PLEASURE IN DOING THINGS: NOT AT ALL
10. IF YOU CHECKED OFF ANY PROBLEMS, HOW DIFFICULT HAVE THESE PROBLEMS MADE IT FOR YOU TO DO YOUR WORK, TAKE CARE OF THINGS AT HOME, OR GET ALONG WITH OTHER PEOPLE: SOMEWHAT DIFFICULT

## 2024-03-18 ASSESSMENT — COLUMBIA-SUICIDE SEVERITY RATING SCALE - C-SSRS
6. HAVE YOU EVER DONE ANYTHING, STARTED TO DO ANYTHING, OR PREPARED TO DO ANYTHING TO END YOUR LIFE?: NO
2. HAVE YOU ACTUALLY HAD ANY THOUGHTS OF KILLING YOURSELF?: NO
1. IN THE PAST MONTH, HAVE YOU WISHED YOU WERE DEAD OR WISHED YOU COULD GO TO SLEEP AND NOT WAKE UP?: NO

## 2024-03-18 NOTE — PROGRESS NOTES
I reviewed the resident/fellow's documentation and discussed the patient with the resident/fellow. I agree with the resident/fellow's medical decision making as documented in the note.    Bob Lugo, DO

## 2024-03-18 NOTE — PATIENT INSTRUCTIONS
[facility] Sleep Medicine   [facility]  [address] [address]  [phone]       NAME: Nat Varela   DATE: 3/18/2024     Your Sleep Provider Today: Bob Lugo DO  Your Primary Care Physician: Torin Parsons DO   Your Referring Provider: Torin Parsons DO    DIAGNOSIS:   1. Sleep disorder breathing        2. Suspected sleep apnea  Referral to Adult Sleep Medicine          Thank you for coming to the Sleep Medicine Clinic today! Your sleep medicine provider today was: Bob Lugo DO Below is a summary of your treatment plan, other important information, and our contact numbers:      TREATMENT PLAN     Follow up after sleep study or sooner as needed.       IMPORTANT INFORMATION     Call 911 for medical emergencies.  Our offices are generally open from Monday-Friday, 9 am - 5 pm.  If you need to get in touch with me, you may either call me and my team(number is below) or you can use Only-apartments.  If a referral for a test, for CPAP, or for another specialist was made, and you have not heard about scheduling this within a week, please call scheduling at [phone]).  If you are unable to make your appointment for clinic or an overnight study, kindly call the office at least 48 hours in advance to cancel and reschedule.  If you are on CPAP, please bring your device's card or the device to each clinic appointment.   There are no supporting services by either the sleep doctors or their staff on weekends and Holidays, or after 5 PM on weekdays.   If you have been asked to come to a sleep study, make sure you bring toiletries, a comfy pillow, and any nighttime medications that you may regularly take. Also be sure to eat dinner before you arrive. We generally do not provide meals.      PRESCRIPTIONS     We require 7 days advanced notice for prescription refills. If we do not receive the request in this time, we cannot guarantee that your  medication will be refilled in time.      IMPORTANT PHONE NUMBERS     Sleep Medicine Clinic Fax: [phone]  Appointments (for Pediatric Sleep Clinic): [phone] - option 1  Appointments (for Adult Sleep Clinic): [phone] - option 2  Appointments (For Sleep Studies): [phone] - option 3  Behavioral Sleep Medicine: [phone]  Sleep Surgery: [phone]  ENT (Otolaryngology): [phone]  Headache Clinic (Neurology): [phone]  Neurology: [phone]  Psychiatry: [phone]  Pulmonary Function Testing (PFT) Center: [phone]  Pulmonary Medicine: [phone]  [employer] (DME): [phone]  [employer] (DME): [phone]  [facility] ([facility]): [phone]      OUR ADULT SLEEP MEDICINE TEAM   Please do not hesitate to call the office or sleep nurse with any questions between appointments:    Adult Sleep Nurses (Linda Quiroz, RN and Roseann Navarro RN):  For clinical questions and refilling prescriptions: [phone]  Email sleep diaries and other documents at: [email or web address]    Adult Sleep Medicine Secretaries:  Jessi Leslie (For Becky/Holbrook/Krise/Strohl/Yeh/Vázquez):   P: [phone]  F: [phone]  Kenia Bro (For Flynn/Guggenbiller): P: [phone]  Fax: [phone]  Sita Zhou (For Jurcevic/Blank): P: [phone]  F: [phone]  Modesta Montalvo (For Mallory): P: [phone]  F: [phone]  Karli Norwood (For Nasrin/Laurel/Zakhary): P: [phone]  F: [phone]  Terri Recinos (For Vu/Enciso): P: [phone]  F: [phone]     Adult Sleep Medicine Advanced Practice Providers:  Raleigh Vazquez ([place], [place])  Suzie Barragan ([facility])  Joann De La Cruz CNP ([place], [place], [facility])  Tiara Velasquez CNP ([place], [place], [facility])  Binta Flower ([facility], [facility], [facility])  Kamlesh Enciso CNP (Critical access hospital)        OUR SLEEP TESTING LOCATIONS     Our team will  "contact you to schedule your sleep study, however, you can contact us as follow:  Main Phone Line (scheduling only): [phone]), option 3  Adult and Pediatric Locations   [place] (6 years and older): [facility] - 4th floor ([address] [facility] [facility], [facility]) After hours line: [phone]  [facility] (Main campus: All ages): [facility], 6th floor. After hours line: [phone]   [place] (5 years and older; younger considered on case-by-case basis): [address]; [address], [address]. Scheduling  After hours line: [phone]   [place] (6 years and older): [address]; [address]; [address]   [place] (6 years and older): [address] [facility], [address]  After hours line: [phone]   [place] (13 years and older) in [place]: [address], 2nd floor  After hours line: [phone]   [place] (13 year and older): [address]  After hours line: [phone]     Adult Only Locations:   [place] (18 years and older): [address] [facility], 2nd floor   [place] (18 years and older): [address] [facility]; 4th floor  After hours line: [phone]  [facility] (18 years and older) at [place]: [id number] [place] [address]  After hours line: [phone]          CONTACTING YOUR SLEEP MEDICINE PROVIDER     Send a message directly to your provider through \"My Chart\", which is the email service through your  Records Account: [email or web address]   Call [phone] and leave a message. One of the administrative assistants will forward the message to your sleep medicine provider through \"My Chart\" and/or email.     Your sleep medicine provider for this visit was: Bob Lugo, DO        "

## 2024-03-18 NOTE — PROGRESS NOTES
Patient: Nat Varela    [id number]  : 1975 -- AGE 48 y.o.    Provider: Bob Lugo DO     Location [facility]   Service Date: 3/18/2024              [facility] Medicine [facility]  New Visit Note      HISTORY OF PRESENT ILLNESS     The patient's referring provider is: Torin Parsons DO    HISTORY OF PRESENT ILLNESS   Nat Varela is a 48 y.o. female.  Referral (Gasping in her sleep. Looks like she stops breathing in her sleep. Very hard time falling asleep. No sleep study. ).     The patient  has a past medical history of Pneumonia, unspecified organism (2015)..    PAST SLEEP HISTORY      CURRENT HISTORY    Pmhx includes HTN, vit D deficiency, DM, allergies, on nicotine patches, asthma, copd.      On today's visit, 3/18/2024, the patient reports: Patient has been waking up gasping for air and has had witnessed apneas for years. She has not tried any intervention.   She would like to be evaluated for possible sleep apnea.     Sleep schedule  on weekdays / work days:  Usual Bedtime: 10pm (doesn't fall asleep until 3-5am  Sleep latency: hours, watches TV  Wake time : 6 am  Total sleep time average/day: 5 hours/day  Awakenings: 1-3x per night, every other night, due to gasping/ choking.   Naps: 1hr each, 1-2x per day, sometimes refreshing      Sleep schedule  on weekends/non work days :    Same schedule as weekdays    Sleep aid: no  Stimulants: no     Occupation: filing for disability. 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     Preferred sleeping position: SLEEP POSITION: sidelying    Sleep-related ROS:    Snoring:  y  Witnessed apneas:  y      Gasping/ choking; y  Am Dry mouth: y            Nasal congestion:  y       am headaches: n    Sleep is described as  unrefreshing.     Daytime sleepiness: y  Fatigue or decreased energy: y  Difficulty remembering things in daytime: n  Difficulty staying focused in daytime: n  Irritable  during the day: sometimes  Drowsy driving: does not drive    RLS screen:  denies    Sleep-related behaviors: denies    Sleep environment:  Bed partner :  yes  Pets in bed :   n  Bedroom temperature: BEDROOM TEMP: cool  Noise :   fan  Issues with bed comfort : n    Daytime Symptoms    ESS: 12       REVIEW OF SYSTEMS     REVIEW OF SYSTEMS  Review of Systems   All other systems reviewed and are negative.      ALLERGIES AND MEDICATIONS     ALLERGIES  Allergies   Allergen Reactions    Aspirin Anaphylaxis, Angioedema and Unknown    Penicillins Rash       MEDICATIONS  Current Outpatient Medications   Medication Sig Dispense Refill    albuterol (ProAir HFA) 90 mcg/actuation inhaler Inhale 2 puffs every 4 hours if needed for wheezing or shortness of breath. 8.5 g 3    albuterol 2.5 mg /3 mL (0.083 %) nebulizer solution Take 3 mL (2.5 mg) by nebulization 4 times a day as needed for wheezing or shortness of breath. 180 mL 11    budesonide-formoteroL (Symbicort) 160-4.5 mcg/actuation inhaler Inhale 2 puffs 2 times a day. Rinse mouth with water after use to reduce aftertaste and incidence of candidiasis. Do not swallow. 1 each 11    cetirizine (ZyrTEC) 10 mg tablet Take 1 tablet (10 mg) by mouth once daily. Take in the evening before bedtime 90 tablet 3    cholecalciferol (Vitamin D3) 5,000 Units tablet Take 1 tablet (5,000 Units) by mouth once daily. 90 tablet 1    furosemide (Lasix) 20 mg tablet Take 1 tablet (20 mg) by mouth once daily. 30 tablet 11    lisinopril 10 mg tablet Take 1 tablet (10 mg) by mouth once daily. 30 tablet 5    metFORMIN XR (Glucophage-XR) 500 mg 24 hr tablet Take 1 tablet (500 mg) by mouth once daily with a meal. Do not crush, chew, or split. 30 tablet 11    nicotine (Nicoderm CQ) 21 mg/24 hr patch 1 patch daily for 6 weeks 42 patch 0    nicotine polacrilex (Commit) 4 mg lozenge Weeks 1-6: 1 lozenge every 1-2 hrs (max: 5 every 6 hours; 20/day). Weeks 7-9: 1 marcy every 2-4 hrs (max: 5 every 6 hours;  20/day). Weeks 10-12: 1 every 4-8 hrs (max: 5 every 6 hours; 20/day). 200 lozenge 2    rosuvastatin (Crestor) 20 mg tablet Take 1 tablet (20 mg) by mouth once daily. 30 tablet 11     No current facility-administered medications for this visit.         PAST HISTORY     PAST MEDICAL HISTORY  She  has a past medical history of Pneumonia, unspecified organism (05/22/2015).  T2 Diabetes, HLD, HTN, asthma, COPD    PAST SURGICAL HISTORY:  No past surgical history on file.    FAMILY HISTORY  Family History   Problem Relation Name Age of Onset    Other (htn) Mother       DOES/DOES NOT EC: does not have a family history of sleep disorder.      SOCIAL HISTORY  She  reports that she has been smoking cigarettes. She has been smoking an average of 1.5 packs per day. She has never used smokeless tobacco. She reports current alcohol use. She reports that she does not use drugs. She currently lives with a partner.   Patient     Caffeine consumption: 1 cup coffee in am, rarely pepsi.         PHYSICAL EXAM     VITAL SIGNS: /86   Pulse 94   Resp 20   Ht 1.524 m (5') Comment: neck circumfrance 15  Wt 123 kg (271 lb 9.6 oz)   SpO2 93%   BMI 53.04 kg/m²        PREVIOUS WEIGHTS:  Wt Readings from Last 3 Encounters:   03/18/24 123 kg (271 lb 9.6 oz)   02/27/24 124 kg (273 lb)   01/16/24 125 kg (275 lb)       Physical Exam    Constitutional: Alert and oriented, cooperative, no obvious distress.   HENT: normocephalic.   Eyes: PERRLA, nonicteric   Neck: Supple, trachea midline   respiratory: CTA bilaterally, no wheezing/ crackles/ cough  Cardiac: no rub/ gallops  GI:BS in all 4 quadrants, Soft, nontender, no masses  musculoskeletal/ Extremities: No clubbing  integumentary: no significant rashes observed.   Neurologic: AOx3.   psychiatric: appropriate mood and affect.  Modified Mallampati: 4    RESULTS/DATA         ASSESSMENT/PLAN     Ms. Varela is a 48 y.o. female and  has a past medical history of Pneumonia, unspecified  kamron (05/22/2015). She was referred to the [facility] for evaluation of snoring, and gasping/ choking.     Problem List Items Addressed This Visit    None  Visit Diagnoses       Sleep disorder breathing    -  Primary    Suspected sleep apnea                Problem List and Orders    Pmhx includes HTN, vit D deficiency, DM?, allergies, smoker, asthma, copd.     1- sleep disorder breathing    Symptoms include snoring, witnessed apneas, gasping/ chocking, night night time awakenings, daytime sleepiness.     Ordered sleep study with TcCO2 monitoring.     -do not drive or operate heavy machinery if drowsy.  -avoid sedating substances/ medication, alcohol, illicit drugs and tobacco.      2- Obesity  counseled on eating a healthy diet and exercising as tolerated.  Referred to nutrition     Follow up after sleep study or sooner as needed.

## 2024-03-26 ENCOUNTER — HOSPITAL ENCOUNTER (OUTPATIENT)
Dept: CARDIOLOGY | Facility: CLINIC | Age: 49
Discharge: HOME | End: 2024-03-26
Payer: COMMERCIAL

## 2024-03-26 VITALS
WEIGHT: 271 LBS | DIASTOLIC BLOOD PRESSURE: 86 MMHG | SYSTOLIC BLOOD PRESSURE: 130 MMHG | BODY MASS INDEX: 53.2 KG/M2 | HEIGHT: 60 IN

## 2024-03-26 DIAGNOSIS — R06.09 DOE (DYSPNEA ON EXERTION): ICD-10-CM

## 2024-03-26 DIAGNOSIS — R06.00 DYSPNEA, UNSPECIFIED: ICD-10-CM

## 2024-03-26 LAB
AORTIC VALVE MEAN GRADIENT: 5.7 MMHG
AORTIC VALVE PEAK VELOCITY: 1.75 M/S
AV PEAK GRADIENT: 12.3 MMHG
AVA (PEAK VEL): 1.77 CM2
AVA (VTI): 2.06 CM2
EJECTION FRACTION APICAL 4 CHAMBER: 62.3
EJECTION FRACTION: 62 %
LEFT ATRIUM VOLUME AREA LENGTH INDEX BSA: 18.2 ML/M2
LEFT VENTRICLE INTERNAL DIMENSION DIASTOLE: 3.34 CM (ref 3.5–6)
LEFT VENTRICULAR OUTFLOW TRACT DIAMETER: 1.84 CM
MITRAL VALVE E/A RATIO: 0.85
RIGHT VENTRICLE FREE WALL PEAK S': 1.2 CM/S
TRICUSPID ANNULAR PLANE SYSTOLIC EXCURSION: 2.4 CM

## 2024-03-26 PROCEDURE — 93306 TTE W/DOPPLER COMPLETE: CPT | Performed by: STUDENT IN AN ORGANIZED HEALTH CARE EDUCATION/TRAINING PROGRAM

## 2024-03-26 PROCEDURE — 93306 TTE W/DOPPLER COMPLETE: CPT

## 2024-04-16 ENCOUNTER — OFFICE VISIT (OUTPATIENT)
Dept: PRIMARY CARE | Facility: CLINIC | Age: 49
End: 2024-04-16
Payer: COMMERCIAL

## 2024-04-16 VITALS
BODY MASS INDEX: 55.97 KG/M2 | SYSTOLIC BLOOD PRESSURE: 122 MMHG | HEART RATE: 84 BPM | OXYGEN SATURATION: 93 % | WEIGHT: 286.6 LBS | DIASTOLIC BLOOD PRESSURE: 74 MMHG | TEMPERATURE: 98 F

## 2024-04-16 DIAGNOSIS — E66.01 CLASS 3 SEVERE OBESITY DUE TO EXCESS CALORIES WITH SERIOUS COMORBIDITY AND BODY MASS INDEX (BMI) OF 50.0 TO 59.9 IN ADULT (MULTI): ICD-10-CM

## 2024-04-16 DIAGNOSIS — G47.33 OSA (OBSTRUCTIVE SLEEP APNEA): ICD-10-CM

## 2024-04-16 DIAGNOSIS — Z00.00 HEALTHCARE MAINTENANCE: ICD-10-CM

## 2024-04-16 DIAGNOSIS — J44.9 CHRONIC OBSTRUCTIVE PULMONARY DISEASE, UNSPECIFIED COPD TYPE (MULTI): ICD-10-CM

## 2024-04-16 DIAGNOSIS — F17.200 CURRENT SMOKER: ICD-10-CM

## 2024-04-16 DIAGNOSIS — K21.9 GASTROESOPHAGEAL REFLUX DISEASE, UNSPECIFIED WHETHER ESOPHAGITIS PRESENT: Primary | ICD-10-CM

## 2024-04-16 DIAGNOSIS — E11.9 CONTROLLED TYPE 2 DIABETES MELLITUS WITHOUT COMPLICATION, WITHOUT LONG-TERM CURRENT USE OF INSULIN (MULTI): ICD-10-CM

## 2024-04-16 LAB
ALBUMIN SERPL BCP-MCNC: 4 G/DL (ref 3.4–5)
ANION GAP SERPL CALC-SCNC: 16 MMOL/L (ref 10–20)
BUN SERPL-MCNC: 15 MG/DL (ref 6–23)
CALCIUM SERPL-MCNC: 9.2 MG/DL (ref 8.6–10.6)
CHLORIDE SERPL-SCNC: 100 MMOL/L (ref 98–107)
CO2 SERPL-SCNC: 26 MMOL/L (ref 21–32)
CREAT SERPL-MCNC: 0.67 MG/DL (ref 0.5–1.05)
EGFRCR SERPLBLD CKD-EPI 2021: >90 ML/MIN/1.73M*2
EST. AVERAGE GLUCOSE BLD GHB EST-MCNC: 143 MG/DL
GLUCOSE SERPL-MCNC: 134 MG/DL (ref 74–99)
HBA1C MFR BLD: 6.6 %
PHOSPHATE SERPL-MCNC: 5.2 MG/DL (ref 2.5–4.9)
POTASSIUM SERPL-SCNC: 4 MMOL/L (ref 3.5–5.3)
SODIUM SERPL-SCNC: 138 MMOL/L (ref 136–145)

## 2024-04-16 PROCEDURE — 3078F DIAST BP <80 MM HG: CPT | Performed by: STUDENT IN AN ORGANIZED HEALTH CARE EDUCATION/TRAINING PROGRAM

## 2024-04-16 PROCEDURE — 4010F ACE/ARB THERAPY RXD/TAKEN: CPT | Performed by: STUDENT IN AN ORGANIZED HEALTH CARE EDUCATION/TRAINING PROGRAM

## 2024-04-16 PROCEDURE — 80069 RENAL FUNCTION PANEL: CPT

## 2024-04-16 PROCEDURE — 99406 BEHAV CHNG SMOKING 3-10 MIN: CPT | Performed by: STUDENT IN AN ORGANIZED HEALTH CARE EDUCATION/TRAINING PROGRAM

## 2024-04-16 PROCEDURE — 3074F SYST BP LT 130 MM HG: CPT | Performed by: STUDENT IN AN ORGANIZED HEALTH CARE EDUCATION/TRAINING PROGRAM

## 2024-04-16 PROCEDURE — 3008F BODY MASS INDEX DOCD: CPT | Performed by: STUDENT IN AN ORGANIZED HEALTH CARE EDUCATION/TRAINING PROGRAM

## 2024-04-16 PROCEDURE — 99214 OFFICE O/P EST MOD 30 MIN: CPT | Performed by: STUDENT IN AN ORGANIZED HEALTH CARE EDUCATION/TRAINING PROGRAM

## 2024-04-16 PROCEDURE — 83036 HEMOGLOBIN GLYCOSYLATED A1C: CPT

## 2024-04-16 PROCEDURE — 36415 COLL VENOUS BLD VENIPUNCTURE: CPT

## 2024-04-16 RX ORDER — PANTOPRAZOLE SODIUM 40 MG/1
40 TABLET, DELAYED RELEASE ORAL DAILY
Qty: 30 TABLET | Refills: 5 | Status: SHIPPED | OUTPATIENT
Start: 2024-04-16 | End: 2024-10-13

## 2024-04-16 ASSESSMENT — PATIENT HEALTH QUESTIONNAIRE - PHQ9
SUM OF ALL RESPONSES TO PHQ9 QUESTIONS 1 AND 2: 0
1. LITTLE INTEREST OR PLEASURE IN DOING THINGS: NOT AT ALL
2. FEELING DOWN, DEPRESSED OR HOPELESS: NOT AT ALL

## 2024-04-16 NOTE — PROGRESS NOTES
"Subjective   Patient ID: Nat Varela is a 48 y.o. female who presents for Follow-up (3mo follow up; discuss heart).  HPI  Nat is here today for follow up visit.    She reports that her breathing has been improved recently. She has been moving around more, does get some exertional dyspnea. She is down to 1 cigarette daily. She is motivated to stop smoking. She is scheduled to follow up with the nutritionist, would like to lose weight. Trying to implement dietary changes. She would like to have her eyes checked.     ROS  12-point ROS was reviewed and is negative, unless otherwise noted in HPI    Objective   Vitals:    04/16/24 1017   BP: 122/74   Pulse: 84   Temp: 36.7 °C (98 °F)   SpO2: 93%      GEN: alert, conversant   HEENT: PERRL, EOMI    NECK: supple, no LAD appreciated  CHEST: improved air movement, no significant wheezing at this time.  CV: RRR, no pitting edema  ABD: soft, nondistended, nontender  EXT: no obvious deformities  SKIN: warm, dry    Assessment and Plan:  #COPD, chronic bronchitis  #Tobacco use disorder  #pulmonary nodules, up to ~3mm  Chronic wet cough, improved. PFTs obtained recently. Significant tobacco abuse. down to 1 cigarette daily, working to stop completely  - Counseled for greater than 5 minutes on cessation.   - Continue Symbicort daily, albuterol PRN (Trellegy was denied by insurance)  - Following with Pulmonology  - Repeat LDCT in 12/2024    #Pulmonary hypertension, mixed class II and III  VIVIEN, tobacco use, COPD. Optimization of medical regimen.  #DLD  #HTN  - Continue Lisinopril 10mg daily, furosemide 20mg daily. Repeat RFP today  - Continue Rosuvastatin 20mg daily    #VIVIEN, suspect  Would certainly benefit from weight loss and a sleep study. High STOPBANG score. Reportedly \"stops breathing\" during sleep per letty.   - Referral to Sleep Medicine placed, awaiting sleep study    #Diabetes Mellitus type 2  Most Recent HgbA1c: 6.8%  - Continue Metformin 500mg daily  Lipid " "panel, microalbumin checked less than a year ago.  Increase dietary efforts and physical activity.  Routine diabetic retinopathy screening, foot exam/monofilament testing screening: up-to-date.  - check Hgba1c today    #Obesity, class III  Sedentary lifestyle, consistent with \"blue bloater\" phenotype.  Counseled on weight loss and dietary modifications.    #GERD  - start pantoprazole daily PRN    #skin changes  - Referral to dermatology placed at last visit    Health Maintenance:   Vaccines: Flu (UTD per patient), PNA (received Pneumovax, recommended Prevnar 20), Shingles (UTD)  Screening: LDCT (repeat 12/2024), mammogram (2024), Colonoscopy (cologuard ordered, instructed to send in when able), Paptest (last 2022)  Labs: Reviewed recent labwork. Repeat Hgba1c and RFP today    RTC in 6 months, or sooner PRN    oTrin Parsons,       "

## 2024-04-18 ENCOUNTER — TELEPHONE (OUTPATIENT)
Dept: PRIMARY CARE | Facility: CLINIC | Age: 49
End: 2024-04-18
Payer: COMMERCIAL

## 2024-04-18 NOTE — TELEPHONE ENCOUNTER
Result Communication    Resulted Orders   Hemoglobin A1C   Result Value Ref Range    Hemoglobin A1C 6.6 (H) see below %    Estimated Average Glucose 143 Not Established mg/dL    Narrative    Diagnosis of Diabetes-Adults  Non-Diabetic: < or = 5.6%  Increased risk for developing diabetes: 5.7-6.4%  Diagnostic of diabetes: > or = 6.5%    Monitoring of Diabetes  Age (y)....................... Therapeutic Goal (%)  Adults: >18.........................<7.0  Pediatrics: 13-18...................<7.5  Pediatrics: 7-12....................<8.0  Pediatrics: 0-6..................... 7.5-8.5    American Diabetes Association. Diabetes Care 33(S1), Jan 2010       Renal Function Panel   Result Value Ref Range    Glucose 134 (H) 74 - 99 mg/dL    Sodium 138 136 - 145 mmol/L    Potassium 4.0 3.5 - 5.3 mmol/L    Chloride 100 98 - 107 mmol/L    Bicarbonate 26 21 - 32 mmol/L    Anion Gap 16 10 - 20 mmol/L    Urea Nitrogen 15 6 - 23 mg/dL    Creatinine 0.67 0.50 - 1.05 mg/dL    eGFR >90 >60 mL/min/1.73m*2      Comment:      Calculations of estimated GFR are performed using the 2021 CKD-EPI Study Refit equation without the race variable for the IDMS-Traceable creatinine methods.  https://jasn.asnjournals.org/content/early/2021/09/22/ASN.8514206254    Calcium 9.2 8.6 - 10.6 mg/dL    Phosphorus 5.2 (H) 2.5 - 4.9 mg/dL      Comment:      The performance characteristics of phosphorus testing in heparinized plasma have been validated by the individual  laboratory site where testing is performed. Testing on heparinized plasma is not approved by the FDA; however, such approval is not necessary.    Albumin 4.0 3.4 - 5.0 g/dL       10:17 AM    Called to discuss recent lab testing results.

## 2024-04-22 ENCOUNTER — APPOINTMENT (OUTPATIENT)
Dept: NUTRITION | Facility: CLINIC | Age: 49
End: 2024-04-22
Payer: COMMERCIAL

## 2024-05-17 PROBLEM — E66.01 CLASS 3 SEVERE OBESITY DUE TO EXCESS CALORIES WITH SERIOUS COMORBIDITY AND BODY MASS INDEX (BMI) OF 50.0 TO 59.9 IN ADULT (MULTI): Status: ACTIVE | Noted: 2024-05-17

## 2024-05-17 PROBLEM — E66.813 CLASS 3 SEVERE OBESITY DUE TO EXCESS CALORIES WITH SERIOUS COMORBIDITY AND BODY MASS INDEX (BMI) OF 50.0 TO 59.9 IN ADULT: Status: ACTIVE | Noted: 2024-05-17

## 2024-06-24 ENCOUNTER — APPOINTMENT (OUTPATIENT)
Dept: SLEEP MEDICINE | Facility: CLINIC | Age: 49
End: 2024-06-24
Payer: COMMERCIAL

## 2024-06-26 ENCOUNTER — APPOINTMENT (OUTPATIENT)
Dept: SLEEP MEDICINE | Facility: HOSPITAL | Age: 49
End: 2024-06-26
Payer: COMMERCIAL

## 2024-07-03 DIAGNOSIS — I10 PRIMARY HYPERTENSION: ICD-10-CM

## 2024-07-03 RX ORDER — LISINOPRIL 10 MG/1
10 TABLET ORAL DAILY
Qty: 30 TABLET | Refills: 5 | Status: SHIPPED | OUTPATIENT
Start: 2024-07-03 | End: 2024-12-30

## 2024-08-01 ENCOUNTER — APPOINTMENT (OUTPATIENT)
Dept: DERMATOLOGY | Facility: CLINIC | Age: 49
End: 2024-08-01
Payer: COMMERCIAL

## 2024-08-28 ENCOUNTER — OFFICE VISIT (OUTPATIENT)
Dept: PULMONOLOGY | Facility: HOSPITAL | Age: 49
End: 2024-08-28
Payer: COMMERCIAL

## 2024-08-28 VITALS
HEART RATE: 91 BPM | SYSTOLIC BLOOD PRESSURE: 124 MMHG | TEMPERATURE: 97.7 F | DIASTOLIC BLOOD PRESSURE: 85 MMHG | RESPIRATION RATE: 17 BRPM | WEIGHT: 266.32 LBS | BODY MASS INDEX: 52.01 KG/M2 | OXYGEN SATURATION: 97 %

## 2024-08-28 DIAGNOSIS — J45.909 ASTHMA, UNSPECIFIED ASTHMA SEVERITY, UNSPECIFIED WHETHER COMPLICATED, UNSPECIFIED WHETHER PERSISTENT (HHS-HCC): Primary | ICD-10-CM

## 2024-08-28 DIAGNOSIS — J42 CHRONIC BRONCHITIS, UNSPECIFIED CHRONIC BRONCHITIS TYPE (MULTI): ICD-10-CM

## 2024-08-28 DIAGNOSIS — F17.210 CIGARETTE NICOTINE DEPENDENCE WITHOUT COMPLICATION: ICD-10-CM

## 2024-08-28 DIAGNOSIS — R06.09 DOE (DYSPNEA ON EXERTION): ICD-10-CM

## 2024-08-28 DIAGNOSIS — J30.9 ALLERGIC RHINITIS, UNSPECIFIED SEASONALITY, UNSPECIFIED TRIGGER: ICD-10-CM

## 2024-08-28 PROCEDURE — 99214 OFFICE O/P EST MOD 30 MIN: CPT | Performed by: NURSE PRACTITIONER

## 2024-08-28 PROCEDURE — 3079F DIAST BP 80-89 MM HG: CPT | Performed by: NURSE PRACTITIONER

## 2024-08-28 PROCEDURE — 3074F SYST BP LT 130 MM HG: CPT | Performed by: NURSE PRACTITIONER

## 2024-08-28 PROCEDURE — 99406 BEHAV CHNG SMOKING 3-10 MIN: CPT | Performed by: NURSE PRACTITIONER

## 2024-08-28 RX ORDER — GUAIFENESIN 100 MG/5ML
200 SOLUTION ORAL 3 TIMES DAILY PRN
Qty: 120 ML | Refills: 0 | Status: SHIPPED | OUTPATIENT
Start: 2024-08-28 | End: 2024-09-07

## 2024-08-28 RX ORDER — PREDNISONE 10 MG/1
TABLET ORAL
Qty: 12 TABLET | Refills: 0 | Status: SHIPPED | OUTPATIENT
Start: 2024-08-28 | End: 2024-09-27

## 2024-08-28 ASSESSMENT — ENCOUNTER SYMPTOMS
MYALGIAS: 0
EYE PAIN: 0
DIARRHEA: 0
JOINT SWELLING: 0
RHINORRHEA: 0
ARTHRALGIAS: 0
SINUS PRESSURE: 0
ABDOMINAL PAIN: 0
VOICE CHANGE: 0
HEADACHES: 0
NUMBNESS: 0
AGITATION: 0
NAUSEA: 0
WEAKNESS: 0
NERVOUS/ANXIOUS: 0
FEVER: 0
VOMITING: 0
FATIGUE: 0
BACK PAIN: 0
DIZZINESS: 0
PALPITATIONS: 0

## 2024-08-28 ASSESSMENT — PAIN SCALES - GENERAL: PAINLEVEL: 0-NO PAIN

## 2024-08-28 NOTE — PATIENT INSTRUCTIONS
Chronic bronchitis/dyspnea on exertion/ Asthma  : PFTs without obstruction/ BD resp with mild restriction.   - continue Symbicort 2 puffs twice daily - rinse mouth out afterwards   - continue albuterol HFA inhaler 2 puffs or albuterol nebulizer treatment every 4-6 hours as needed for shortness of breath    - Continue to increase activity as tolerated   - will send some guaifenesin cough syrup as needed   - will do short course of prednisone today     2. Lung cancer screening   - >5 minutes smoking cessation counseling -- given [facility] book at previous visit   - continue nicotine patches 21mg daily   - continue nicotine lozenges PRN   - lung cancer screening in 12/2024 (after 12/29/24)     3. Allergic rhinitis:   - continue cetirizine (zyrtec) 10mg daily at bedtime     4. Snoring: seen by Dr. Lugo   - need to schedule sleep study     5. GERD/ vomiting episodes: resolved   - continue pantoprazole daily     Thank you for visiting the Pulmonary clinic today!   Return to clinic 6 months after CT chest or sooner if needed   Leeanna Lux CNP  My office -  [phone]- Brisa is my  and Lisa is my nurse.   Radiology scheduling [phone]   Appointment scheduling [phone]   Pulmonary function testing - [phone]

## 2024-08-28 NOTE — PROGRESS NOTES
Patient: Nat Varela    [id number]  : 1975 -- AGE 49 y.o.    Provider: WANG Mckeon-CNP     Location [facility]   Service Date: 2024              [facility]  Follow up Visit Note      HISTORY OF PRESENT ILLNESS     The patient's referring provider is: No ref. provider found    HISTORY OF PRESENT ILLNESS   Nat Varela is a 49 y.o. female (current smoker) who presents to a [facility] for an evaluation with concerns of Follow-up. I have independently interviewed and examined the patient in the office and reviewed available records.    Current History  Since last visit she has been doing much better. She currently is getting over a slight cold and has been doing green tea, lemon, and sanna. She takes the cetrizine daily. She has had some issues with the weather changing. She has a productive cough - she feels related to her chest opening up with quitting smoking. She has been using th symbicort 2 puffs daily - sometimes again in the evening and she rarely needs the albuterol HFA. She has not needed the albuterol nebulizers. She was able to get the nebulizer machine. She overall feels a lot better since using the symbicort. She states cough is productive with  white mucous. She denies any wheezing, SOB at rest, or CP. 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 She has CRISTINA with walking on level ground after a few minutes - better than it was. She was having GERD symptoms as well as vomiting. She was started on pantoprazole by her PCP with good control of her symptoms. She has been cutting down on smoking -- down to 1 cigarette a day.   She was seen by Dr. Lugo - needs to schedule her sleep study. She was able to get the nicotine patches/ lozenges - she is trying to get a new job and they are going to test her for nicotine.     24: She was started on the  symbicort about a month ago. She has CRISTINA with walking on level ground. She states she has always had issues with chronic bronchitis, but over the last 1-2 years it has been significantly worse. She states it has been going on for a while. She has been using her symbicort twice a day. She has been using her albuterol 3-4 x a day. She has been trying to walk with her fiance - states she has to stop and take breaks with exercise. She has a productive cough with yellowish mucous - thick. She states this is at her baseline. She feels the symbicort helps loosen to mucous so she is able to bring it up. She does not have a nebulizer. Her fiance notices wheezing with exertion. She will at times have episodes of SOB at rest. She has issues with her sinuses - runny nose, post nasal drip, water eyes, sneezing, and itchy throat. She states her symptoms are year round. She previously taking bendryl - as a kid she was on allergy shots. She has had episodes of GERD with eating - states at times she will vomit the food up. She states this is happening 1-2 x per week. She states she has noticed certain foods/ beverages trigger her. She tries to sit up after she eats, but if she doesn't that makes it worse. She says her GERD has been worse over the last 6 months. She has chest tightness with exertion - improves with albuterol inhaler, but it takes a minute. She denies any sharp chest pains. No LE swelling -- states they were swollen when she saw her PCP in Dec and she was started on BP pills. She is still smoking, but working on cutting down.     ACT today 9/ CAT today 32    Previous pulmonary history: She was previously told she has asthma. She was told in 2005 she was told she had COPD. She was on PRN albuterol back then.     Inhalers/nebulized medications: symbicort and albuterol     Hospitalization History: She has not been hospitalized over the last year for breathing related problem.    Sleep history: + snoring and witnessed  apneas - referred to sleep by PCP        ALLERGIES AND MEDICATIONS     ALLERGIES  Allergies   Allergen Reactions    Aspirin Anaphylaxis, Angioedema and Unknown    Penicillins Rash       MEDICATIONS  Current Outpatient Medications   Medication Sig Dispense Refill    albuterol (ProAir HFA) 90 mcg/actuation inhaler Inhale 2 puffs every 4 hours if needed for wheezing or shortness of breath. 8.5 g 3    albuterol 2.5 mg /3 mL (0.083 %) nebulizer solution Take 3 mL (2.5 mg) by nebulization 4 times a day as needed for wheezing or shortness of breath. 180 mL 11    budesonide-formoteroL (Symbicort) 160-4.5 mcg/actuation inhaler Inhale 2 puffs 2 times a day. Rinse mouth with water after use to reduce aftertaste and incidence of candidiasis. Do not swallow. 1 each 11    cetirizine (ZyrTEC) 10 mg tablet Take 1 tablet (10 mg) by mouth once daily. Take in the evening before bedtime 90 tablet 3    furosemide (Lasix) 20 mg tablet Take 1 tablet (20 mg) by mouth once daily. 30 tablet 11    lisinopril 10 mg tablet Take 1 tablet (10 mg) by mouth once daily. 30 tablet 5    metFORMIN XR (Glucophage-XR) 500 mg 24 hr tablet Take 1 tablet (500 mg) by mouth once daily with a meal. Do not crush, chew, or split. 30 tablet 11    nicotine polacrilex (Commit) 4 mg lozenge Weeks 1-6: 1 lozenge every 1-2 hrs (max: 5 every 6 hours; 20/day). Weeks 7-9: 1 marcy every 2-4 hrs (max: 5 every 6 hours; 20/day). Weeks 10-12: 1 every 4-8 hrs (max: 5 every 6 hours; 20/day). 200 lozenge 2    pantoprazole (ProtoNix) 40 mg EC tablet Take 1 tablet (40 mg) by mouth once daily. Do not crush, chew, or split. 30 tablet 5    rosuvastatin (Crestor) 20 mg tablet Take 1 tablet (20 mg) by mouth once daily. 30 tablet 11     No current facility-administered medications for this visit.         PAST HISTORY     PAST MEDICAL HISTORY  - HTN   - HLD   - DMII   - GERD   - asthma     PAST SURGICAL HISTORY  No past surgical history on file.    IMMUNIZATION HISTORY  Immunization  History   Administered Date(s) Administered    Influenza, Unspecified 2010    Pneumococcal polysaccharide vaccine, 23-valent, age 2 years and older (PNEUMOVAX 23) 2015       SOCIAL HISTORY  Smokin - current 2 ppd - now down to a pack a day ~60 pack years  Alcohol: 2 drinks per week - previously 6 beers a night   Illicit drugs:  none     OCCUPATIONAL/ENVIRONMENTAL HISTORY  Temporarily unemployed - housekeeping, [employer].     FAMILY HISTORY  Dad - lung cancer     RESULTS/DATA     Pulmonary Function Test Results     24 - FEV1/FVC 0.81/ FEV1 1.66 (73% no BD resp)/ FVC 2.03 (74%)/ TLC 3.61 (81%)/ DLCO 92%     Chest Radiograph     CHEST 2 VIEW 2020  Impression  Suspect bronchitis.       Chest CT Scan     CT lung screening low dose 2023  Impression  1.  Few small bilateral noncalcified pulmonary nodules measuring up  to 3 mm, likely benign. Continued screening with low-dose noncontrast  chest CT in 12 months (from current date) is recommended.  2. Mild coronary artery calcifications.    Echocardiogram     3/26/24 -  Left ventricular systolic function is normal with a 55-60% estimated ejection fraction.   2. There is moderate concentric left ventricular hypertrophy.   3. Aortic valve is difficult to visualize in this study. Cannot rule out a bicuspid aortic valve. Recommend ROSANA if clinically indicated.   4. Aortic valve sclerosis.  RA normal size, RV normal size and function       Other testing/ Labs      REVIEW OF SYSTEMS     REVIEW OF SYSTEMS  Review of Systems   Constitutional:  Negative for fatigue and fever.   HENT:  Negative for congestion, postnasal drip, rhinorrhea, sinus pressure and voice change.    Eyes:  Negative for pain and visual disturbance.   Cardiovascular:  Negative for chest pain, palpitations and leg swelling.   Gastrointestinal:  Negative for abdominal pain, diarrhea, nausea and vomiting.   Endocrine: Negative for cold intolerance and heat intolerance.   Musculoskeletal:   Negative for arthralgias, back pain, joint swelling and myalgias.   Skin:  Negative for rash.   Neurological:  Negative for dizziness, weakness, numbness and headaches.   Psychiatric/Behavioral:  Negative for agitation. The patient is not nervous/anxious.          PHYSICAL EXAM     VITAL SIGNS: There were no vitals taken for this visit.     CURRENT WEIGHT: [unfilled]  BMI: [unfilled]  PREVIOUS WEIGHTS:  Wt Readings from Last 3 Encounters:   04/16/24 130 kg (286 lb 9.6 oz)   03/26/24 123 kg (271 lb)   03/18/24 123 kg (271 lb 9.6 oz)       Physical Exam  Vitals reviewed.   Constitutional:       General: She is not in acute distress.     Appearance: Normal appearance. She is obese. She is not ill-appearing or toxic-appearing.   HENT:      Head: Normocephalic.      Nose: No rhinorrhea.   Cardiovascular:      Rate and Rhythm: Normal rate and regular rhythm.      Heart sounds: Normal heart sounds.   Pulmonary:      Effort: Pulmonary effort is normal. No respiratory distress.      Breath sounds: No stridor. Wheezing present. No rhonchi or rales.   Abdominal:      General: Abdomen is flat.   Musculoskeletal:         General: Normal range of motion.      Right lower leg: No edema.      Left lower leg: No edema.   Skin:     General: Skin is warm and dry.      Nails: There is no clubbing.   Neurological:      General: No focal deficit present.      Mental Status: She is alert and oriented to person, place, and time.   Psychiatric:         Mood and Affect: Mood normal.         Behavior: Behavior normal.         Judgment: Judgment normal.         ASSESSMENT/PLAN     Chronic bronchitis/dyspnea on exertion/ Asthma  : PFTs without obstruction/ BD resp with mild restriction.   - continue Symbicort 2 puffs twice daily - rinse mouth out afterwards   - continue albuterol HFA inhaler 2 puffs or albuterol nebulizer treatment every 4-6 hours as needed for shortness of breath    - Continue to increase activity as tolerated   - will send some  guaifenesin cough syrup as needed   - will do short course of prednisone today     2. Lung cancer screening   - >5 minutes smoking cessation counseling -- given [facility] book at previous visit   - continue nicotine patches 21mg daily   - continue nicotine lozenges PRN   - lung cancer screening in 12/2024 (after 12/29/24)     3. Allergic rhinitis:   - continue cetirizine (zyrtec) 10mg daily at bedtime     4. Snoring: seen by Dr. Lugo   - need to schedule sleep study     5. GERD/ vomiting episodes: resolved   - continue pantoprazole daily     Thank you for visiting the Pulmonary clinic today!   Return to clinic 6 months after CT chest or sooner if needed   Leeanna Lux CNP  My office -  [phone]- Brisa is my  and Lisa is my nurse.   Radiology scheduling [phone]   Appointment scheduling [phone]   Pulmonary function testing - [phone]

## 2024-09-16 ENCOUNTER — APPOINTMENT (OUTPATIENT)
Dept: OPHTHALMOLOGY | Facility: CLINIC | Age: 49
End: 2024-09-16
Payer: COMMERCIAL

## 2024-10-07 ENCOUNTER — APPOINTMENT (OUTPATIENT)
Dept: OPHTHALMOLOGY | Facility: CLINIC | Age: 49
End: 2024-10-07
Payer: COMMERCIAL

## 2024-10-22 ENCOUNTER — APPOINTMENT (OUTPATIENT)
Dept: PRIMARY CARE | Facility: CLINIC | Age: 49
End: 2024-10-22
Payer: COMMERCIAL

## 2024-12-11 ENCOUNTER — APPOINTMENT (OUTPATIENT)
Dept: DERMATOLOGY | Facility: CLINIC | Age: 49
End: 2024-12-11
Payer: COMMERCIAL

## 2024-12-30 ENCOUNTER — TELEPHONE (OUTPATIENT)
Dept: RADIOLOGY | Facility: HOSPITAL | Age: 49
End: 2024-12-30
Payer: COMMERCIAL

## 2025-02-11 ENCOUNTER — APPOINTMENT (OUTPATIENT)
Dept: PRIMARY CARE | Facility: CLINIC | Age: 50
End: 2025-02-11
Payer: COMMERCIAL